# Patient Record
Sex: MALE | Race: WHITE | NOT HISPANIC OR LATINO | Employment: FULL TIME | URBAN - NONMETROPOLITAN AREA
[De-identification: names, ages, dates, MRNs, and addresses within clinical notes are randomized per-mention and may not be internally consistent; named-entity substitution may affect disease eponyms.]

---

## 2022-08-16 ENCOUNTER — OFFICE VISIT (OUTPATIENT)
Dept: FAMILY MEDICINE CLINIC | Facility: CLINIC | Age: 57
End: 2022-08-16

## 2022-08-16 VITALS
HEIGHT: 74 IN | DIASTOLIC BLOOD PRESSURE: 84 MMHG | OXYGEN SATURATION: 98 % | BODY MASS INDEX: 28.62 KG/M2 | SYSTOLIC BLOOD PRESSURE: 130 MMHG | WEIGHT: 223 LBS | HEART RATE: 69 BPM

## 2022-08-16 DIAGNOSIS — Z00.00 GENERAL MEDICAL EXAM: Primary | ICD-10-CM

## 2022-08-16 DIAGNOSIS — Z23 NEED FOR TDAP VACCINATION: ICD-10-CM

## 2022-08-16 DIAGNOSIS — Z12.11 SCREENING FOR COLON CANCER: ICD-10-CM

## 2022-08-16 DIAGNOSIS — Z12.5 PROSTATE CANCER SCREENING: ICD-10-CM

## 2022-08-16 DIAGNOSIS — Z13.1 DIABETES MELLITUS SCREENING: ICD-10-CM

## 2022-08-16 PROCEDURE — 99386 PREV VISIT NEW AGE 40-64: CPT | Performed by: FAMILY MEDICINE

## 2022-08-16 PROCEDURE — 93000 ELECTROCARDIOGRAM COMPLETE: CPT | Performed by: FAMILY MEDICINE

## 2022-08-16 PROCEDURE — 90715 TDAP VACCINE 7 YRS/> IM: CPT | Performed by: FAMILY MEDICINE

## 2022-08-16 PROCEDURE — 90471 IMMUNIZATION ADMIN: CPT | Performed by: FAMILY MEDICINE

## 2022-08-16 NOTE — PROGRESS NOTES
"Chief Complaint  Establish Care and Annual Exam    Subjective    History of Present Illness:  Wilmer Fitzgerald is a 56 y.o. male who presents today for annual exam.    No regular medications and exercises on a regular basis.    Past due for health maintenance and screening.  Will consider Shingrix at his pharmacy and plans to get force COVID vaccination.  Interested in Tdap today.    Would like baseline EKG done today to have on his chart in case he has any future issues or problems for comparison.  No chest pain, chest pressure, or shortness of breath.    Past due for screening colonoscopy and he is ready to get that set up.    Discussed prostate cancer screening and he would like screening PSA with fasting labs.  Understands the risk for false positives with this.    Will return to get fasting blood work up-to-date.    Objective   Vital Signs:   /84 (BP Location: Left arm, Patient Position: Sitting, Cuff Size: Large Adult)   Pulse 69   Ht 188 cm (74\")   Wt 101 kg (223 lb)   SpO2 98%   BMI 28.63 kg/m²     Review of Systems   Constitutional: Negative for appetite change, chills and fever.   HENT: Negative for hearing loss.         Episodes of mild hearing decrease noted.  Plans to get in with Dr. Son for full hearing evaluation.   Eyes: Negative for blurred vision.   Respiratory: Negative for chest tightness.    Cardiovascular: Negative for chest pain.   Gastrointestinal: Negative for abdominal pain.   Musculoskeletal: Negative for gait problem.   Skin: Negative for rash.   Psychiatric/Behavioral: Negative for depressed mood.       Past History:  Medical History: has a past medical history of Cholinergic urticaria and Foot trauma, left, sequela.   Surgical History: has no past surgical history on file.   Family History: family history is not on file.   Social History: reports that he has never smoked. He has never used smokeless tobacco. He reports current alcohol use. He reports that he does not use " drugs.    No current outpatient medications on file.    Allergies: Patient has no known allergies.    Physical Exam  Constitutional:       Appearance: Normal appearance.   HENT:      Head: Normocephalic.      Right Ear: Tympanic membrane, ear canal and external ear normal. There is no impacted cerumen.      Left Ear: Tympanic membrane, ear canal and external ear normal. There is no impacted cerumen.      Nose: Nose normal.      Mouth/Throat:      Mouth: Mucous membranes are moist.      Pharynx: Oropharynx is clear.   Eyes:      Conjunctiva/sclera: Conjunctivae normal.      Pupils: Pupils are equal, round, and reactive to light.   Cardiovascular:      Rate and Rhythm: Normal rate and regular rhythm.      Heart sounds: Normal heart sounds. No murmur heard.    No friction rub. No gallop.   Pulmonary:      Effort: Pulmonary effort is normal.      Breath sounds: Normal breath sounds.   Musculoskeletal:         General: Normal range of motion.      Cervical back: Normal range of motion.   Skin:     General: Skin is warm and dry.   Neurological:      General: No focal deficit present.      Mental Status: He is alert.   Psychiatric:         Mood and Affect: Mood normal.         Behavior: Behavior normal.         Thought Content: Thought content normal.          Result Review            ECG 12 Lead    Date/Time: 8/16/2022 10:14 AM  Performed by: Armond Sellers MD  Authorized by: Armond Sellers MD   Rhythm: sinus rhythm  Ectopy: infrequent PVCs  Rate: normal  BPM: 61  Conduction: conduction normal  ST Segments: ST segments normal  T Waves: T waves normal  QRS axis: normal    Clinical impression: normal ECG                Assessment and Plan  Diagnoses and all orders for this visit:    1. General medical exam (Primary)  Assessment & Plan:  Reviewed health maintenance and screening.    Ordered past 2 screening colonoscopy.    Awaiting return for fasting labs with screening PSA.    Updated vaccinations with  Tdap today.    Encourage Shingrix and force COVID booster at his pharmacy.    Baseline EKG obtained with normal sinus rhythm.    Orders:  -     CBC Auto Differential; Future  -     Comprehensive Metabolic Panel; Future  -     Lipid Panel; Future  -     TSH; Future  -     T4, Free; Future  -     ECG 12 Lead    2. Prostate cancer screening  -     PSA Screen; Future    3. Screening for colon cancer  -     Ambulatory Referral For Screening Colonoscopy    4. Need for Tdap vaccination  -     Tdap Vaccine Greater Than or Equal To 6yo IM    5. Diabetes mellitus screening  -     Hemoglobin A1c; Future      BMI is >= 25 and <30. (Overweight) The following options were offered after discussion;: exercise counseling/recommendations          Follow Up  No follow-ups on file.    Armond Sellers MD

## 2022-08-16 NOTE — ASSESSMENT & PLAN NOTE
Reviewed health maintenance and screening.    Ordered past 2 screening colonoscopy.    Awaiting return for fasting labs with screening PSA.    Updated vaccinations with Tdap today.    Encourage Shingrix and force COVID booster at his pharmacy.    Baseline EKG obtained with normal sinus rhythm.

## 2022-08-17 ENCOUNTER — LAB (OUTPATIENT)
Dept: FAMILY MEDICINE CLINIC | Facility: CLINIC | Age: 57
End: 2022-08-17

## 2022-08-17 DIAGNOSIS — Z00.00 GENERAL MEDICAL EXAM: ICD-10-CM

## 2022-08-17 DIAGNOSIS — Z13.1 DIABETES MELLITUS SCREENING: ICD-10-CM

## 2022-08-17 DIAGNOSIS — Z12.5 PROSTATE CANCER SCREENING: ICD-10-CM

## 2022-08-17 PROCEDURE — 36415 COLL VENOUS BLD VENIPUNCTURE: CPT | Performed by: FAMILY MEDICINE

## 2022-08-18 ENCOUNTER — TELEPHONE (OUTPATIENT)
Dept: FAMILY MEDICINE CLINIC | Facility: CLINIC | Age: 57
End: 2022-08-18

## 2022-08-18 LAB
ALBUMIN SERPL-MCNC: 4.3 G/DL (ref 3.8–4.9)
ALBUMIN/GLOB SERPL: 2 {RATIO} (ref 1.2–2.2)
ALP SERPL-CCNC: 39 IU/L (ref 44–121)
ALT SERPL-CCNC: 18 IU/L (ref 0–44)
AST SERPL-CCNC: 18 IU/L (ref 0–40)
BASOPHILS # BLD AUTO: 0 X10E3/UL (ref 0–0.2)
BASOPHILS NFR BLD AUTO: 1 %
BILIRUB SERPL-MCNC: 1.2 MG/DL (ref 0–1.2)
BUN SERPL-MCNC: 15 MG/DL (ref 6–24)
BUN/CREAT SERPL: 17 (ref 9–20)
CALCIUM SERPL-MCNC: 9.3 MG/DL (ref 8.7–10.2)
CHLORIDE SERPL-SCNC: 104 MMOL/L (ref 96–106)
CHOLEST SERPL-MCNC: 277 MG/DL (ref 100–199)
CO2 SERPL-SCNC: 22 MMOL/L (ref 20–29)
CREAT SERPL-MCNC: 0.89 MG/DL (ref 0.76–1.27)
EGFRCR-CYS SERPLBLD CKD-EPI 2021: 101 ML/MIN/1.73
EOSINOPHIL # BLD AUTO: 0.1 X10E3/UL (ref 0–0.4)
EOSINOPHIL NFR BLD AUTO: 2 %
ERYTHROCYTE [DISTWIDTH] IN BLOOD BY AUTOMATED COUNT: 13.1 % (ref 11.6–15.4)
GLOBULIN SER CALC-MCNC: 2.1 G/DL (ref 1.5–4.5)
GLUCOSE SERPL-MCNC: 87 MG/DL (ref 65–99)
HBA1C MFR BLD: 5.3 % (ref 4.8–5.6)
HCT VFR BLD AUTO: 46.9 % (ref 37.5–51)
HDLC SERPL-MCNC: 61 MG/DL
HGB BLD-MCNC: 15.5 G/DL (ref 13–17.7)
IMM GRANULOCYTES # BLD AUTO: 0 X10E3/UL (ref 0–0.1)
IMM GRANULOCYTES NFR BLD AUTO: 0 %
LDLC SERPL CALC-MCNC: 203 MG/DL (ref 0–99)
LYMPHOCYTES # BLD AUTO: 0.9 X10E3/UL (ref 0.7–3.1)
LYMPHOCYTES NFR BLD AUTO: 18 %
MCH RBC QN AUTO: 29.6 PG (ref 26.6–33)
MCHC RBC AUTO-ENTMCNC: 33 G/DL (ref 31.5–35.7)
MCV RBC AUTO: 90 FL (ref 79–97)
MONOCYTES # BLD AUTO: 0.5 X10E3/UL (ref 0.1–0.9)
MONOCYTES NFR BLD AUTO: 9 %
NEUTROPHILS # BLD AUTO: 3.5 X10E3/UL (ref 1.4–7)
NEUTROPHILS NFR BLD AUTO: 70 %
PLATELET # BLD AUTO: 223 X10E3/UL (ref 150–450)
POTASSIUM SERPL-SCNC: 4.6 MMOL/L (ref 3.5–5.2)
PROT SERPL-MCNC: 6.4 G/DL (ref 6–8.5)
PSA SERPL-MCNC: 1.2 NG/ML (ref 0–4)
RBC # BLD AUTO: 5.24 X10E6/UL (ref 4.14–5.8)
SODIUM SERPL-SCNC: 141 MMOL/L (ref 134–144)
T4 FREE SERPL-MCNC: 1.03 NG/DL (ref 0.82–1.77)
TRIGL SERPL-MCNC: 80 MG/DL (ref 0–149)
TSH SERPL DL<=0.005 MIU/L-ACNC: 2.09 UIU/ML (ref 0.45–4.5)
VLDLC SERPL CALC-MCNC: 13 MG/DL (ref 5–40)
WBC # BLD AUTO: 5 X10E3/UL (ref 3.4–10.8)

## 2022-08-18 NOTE — TELEPHONE ENCOUNTER
----- Message from Armond Sellers MD sent at 8/18/2022 12:44 PM EDT -----  Please call patient and let him know that his lab results returned and I did send them through the [employer] message but I wanted to make sure he got the results about his cholesterol being significantly elevated.    Please have him set up a follow-up visit to discuss cholesterol treatment options to help with cardiovascular and stroke risk reduction.  This can be in office or telehealth.

## 2022-08-18 NOTE — TELEPHONE ENCOUNTER
Left message for patient to call back. Please see message below when patient calls back. Hub can read.

## 2022-09-02 ENCOUNTER — TELEMEDICINE (OUTPATIENT)
Dept: FAMILY MEDICINE CLINIC | Facility: CLINIC | Age: 57
End: 2022-09-02

## 2022-09-02 DIAGNOSIS — E78.2 HYPERLIPIDEMIA, MIXED: Primary | ICD-10-CM

## 2022-09-02 PROCEDURE — 99213 OFFICE O/P EST LOW 20 MIN: CPT | Performed by: FAMILY MEDICINE

## 2022-09-02 NOTE — PROGRESS NOTES
Patient was seen today through synchronous audio/video technology. Verbal consent was obtained. The patient was located at home. Vitals signs were not obtained due to lack of home monitoring access.     I was located at our [facility] office location for the telehealth visit.    Chief Complaint  No chief complaint on file.    History of Present Illness  Wilmer Fitzgerald is a 56 y.o. male presenting via video-conference today for follow-up regarding significant hyperlipidemia with recent fasting labs.    Routine physical blood work returned normal except for total cholesterol at 277 and LDL cholesterol at 203.  His HDL cholesterol was 61 and triglycerides were 80.    Other than significant hyperlipidemia he has no significant risk factors for coronary artery disease or stroke.    He is interested in further investigation into his hyperlipidemia related to recommendations for medication treatment.    The following portions of the patient's history were reviewed and updated as appropriate: allergies, current medications, past family history, past medical history, past social history, past surgical history and problem list.    Review of Systems   Constitutional: Negative for appetite change, chills, fever and unexpected weight change.   HENT: Negative for hearing loss.    Eyes: Negative for visual disturbance.   Respiratory: Negative for chest tightness, shortness of breath and wheezing.    Cardiovascular: Negative for chest pain, palpitations and leg swelling.   Gastrointestinal: Negative for abdominal pain.   Musculoskeletal: Negative for arthralgias, back pain and gait problem.   Skin: Negative for rash.   Neurological: Negative for dizziness and headaches.   Psychiatric/Behavioral: Negative for agitation and confusion. The patient is not nervous/anxious.        Objective  There were no vitals taken for this visit.    Physical Exam  Constitutional:       General: He is not in acute  distress.     Appearance: Normal appearance. He is not ill-appearing.   HENT:      Head: Normocephalic and atraumatic.      Right Ear: External ear normal.      Left Ear: External ear normal.      Nose: Nose normal.   Eyes:      Extraocular Movements: Extraocular movements intact.      Conjunctiva/sclera: Conjunctivae normal.      Pupils: Pupils are equal, round, and reactive to light.   Pulmonary:      Effort: Pulmonary effort is normal. No respiratory distress.   Musculoskeletal:         General: Normal range of motion.      Cervical back: Normal range of motion.   Skin:     Findings: No rash.   Neurological:      General: No focal deficit present.      Mental Status: He is alert and oriented to person, place, and time.   Psychiatric:         Mood and Affect: Mood normal.         Behavior: Behavior normal.         Thought Content: Thought content normal.           Assessment/Plan   Diagnoses and all orders for this visit:    1. Hyperlipidemia, mixed (Primary)  Assessment & Plan:  Lengthy discussion with patient and his wife today about the significant elevation in his total cholesterol and LDL cholesterol and my concerns that this may translate into increased cardiovascular risk specifically heart attack and stroke risk.    We discussed treatment options and further work-up options.    Discussed further laboratory analysis with NMR LipoProfile is not something that I would routinely recommend, but referral to cardiology to review his lipids and discuss other noninvasive methods of risk factor investigation is an option.    He is interested in possibly having a coronary CT done to evaluate for plaque formation and cardiovascular risk.    We will set up a consultation with Dr. Somers to help us further investigate his cardiovascular risk and recommendation for lipid treatment given he would like more information before starting on medication for this significant hyperlipidemia found with his physical  today.    Questions answered.  Patient and his wife voiced understanding and are comfortable with plan for referral to Dr. Somers..    Orders:  -     Ambulatory Referral to Cardiology  Referral placed to Dr. Somers with [facility] cardiology    No follow-ups on file.    Armond Sellers MD

## 2022-09-02 NOTE — ASSESSMENT & PLAN NOTE
Lengthy discussion with patient and his wife today about the significant elevation in his total cholesterol and LDL cholesterol and my concerns that this may translate into increased cardiovascular risk specifically heart attack and stroke risk.    We discussed treatment options and further work-up options.    Discussed further laboratory analysis with NMR LipoProfile is not something that I would routinely recommend, but referral to cardiology to review his lipids and discuss other noninvasive methods of risk factor investigation is an option.    He is interested in possibly having a coronary CT done to evaluate for plaque formation and cardiovascular risk.    We will set up a consultation with Dr. Somers to help us further investigate his cardiovascular risk and recommendation for lipid treatment given he would like more information before starting on medication for this significant hyperlipidemia found with his physical today.    Questions answered.  Patient and his wife voiced understanding and are comfortable with plan for referral to Dr. Somers..

## 2022-10-11 NOTE — PROGRESS NOTES
OFFICE VISIT  NOTE  [facility] CARDIOLOGY [place] [facility]      Name: Wilmer Fitzgerald    Date: 10/12/2022  MRN:  [id number]  :  1965      REFERRING/PRIMARY PROVIDER:  Armond Sellers MD    Chief Complaint   Patient presents with   • mixed hyperlipidemia        HPI: Wilmer Fitzgerald is a 56 y.o. male who presents today for new consultation for hyperlipidemia.  Recent annual physical with Dr. Sellers, lipids checked, showed total cholesterol of 277 with LDL of 203 and HDL of 61, portending an ASCVD 10-year risk of 6.1%.  He is a healthy person, no diabetes hypertension, premature CVD family history, or smoking.  Participates in moderate intensity aerobic exercise walking at least 3 miles per day without chest pain or shortness of breath.  Follows a modified keto diet, eats unrestricted meat.    Past Medical History:   Diagnosis Date   • Cholinergic urticaria    • COVID-2022   • Foot trauma, left, sequela        History reviewed. No pertinent surgical history.    Social History     Socioeconomic History   • Marital status:    Tobacco Use   • Smoking status: Never   • Smokeless tobacco: Never   Substance and Sexual Activity   • Alcohol use: Yes     Comment: occ.   • Drug use: Never   • Sexual activity: Defer       History reviewed. No pertinent family history.     ROS:   Constitutional no fever,  no weight loss   Skin no rash, no subcutaneous nodules   Otolaryngeal no difficulty swallowing   Cardiovascular See HPI   Pulmonary no cough, no sputum production   Gastrointestinal no constipation, no diarrhea   Genitourinary no dysuria, no hematuria   Hematologic no easy bruisability, no abnormal bleeding   Musculoskeletal no muscle pain   Neurologic no dizziness, no falls         No Known Allergies      Current Outpatient Medications:   •  Cholecalciferol (VITAMIN D3 PO), Take 1 tablet by mouth As Needed., Disp: , Rfl:   •  MAGNESIUM PO, Take 1 tablet by mouth As Needed., Disp: , Rfl:  "    Vitals:    10/12/22 0856   BP: 120/80   BP Location: Left arm   Patient Position: Sitting   Pulse: 69   SpO2: 97%   Weight: 101 kg (223 lb)   Height: 190.5 cm (75\")     Body mass index is 27.87 kg/m².    PHYSICAL EXAM:    General Appearance:   · well developed  · well nourished  HENT:   · oropharynx moist  · lips not cyanotic  Neck:  · thyroid not enlarged  · supple  Respiratory:  · no respiratory distress  · normal breath sounds  · no rales  Cardiovascular:  · no jugular venous distention  · regular rhythm  · apical impulse normal  · S1 normal, S2 normal  · no S3, no S4   · no murmur  · no rub, no thrill  · carotid pulses normal; no bruit  · lower extremity edema: none      Musculoskeletal:  · no clubbing of fingers.   · normocephalic, head atraumatic  Skin:   · warm, dry  Psychiatric:  · judgement and insight appropriate  · normal mood and affect    RESULTS:   Procedures          Labs:  Lab Results   Component Value Date    TRIG 80 08/17/2022    HDL 61 08/17/2022     (H) 08/17/2022    AST 18 08/17/2022    ALT 18 08/17/2022     Lab Results   Component Value Date    HGBA1C 5.3 08/17/2022     No components found for: CREATINININE  No results found for: EGFRIFNONA    Most recent PCP note, imaging tests, and labs reviewed.    ASSESSMENT:  Problem List Items Addressed This Visit        Cardiac and Vasculature    Hyperlipidemia, mixed - Primary         PLAN:    1.  Mixed Hyperlipidemia:  The 10-year ASCVD risk score (Melanie MUSA, et al., 2019) is: 6.1%    Values used to calculate the score:      Age: 56 years      Sex: Male      Is Non- : No      Diabetic: No      Tobacco smoker: No      Systolic Blood Pressure: 120 mmHg      Is BP treated: No      HDL Cholesterol: 61 mg/dL      Total Cholesterol: 277 mg/dL    I had a lengthy discussion with the patient and his wife in the office today regarding his overall 10-year ASCVD risk which is borderline, low risk is considered less than 5%.  " Given his overall excellent health, and asymptomatic status, I feel it is reasonable to work on diet and continued exercise for ASCVD risk reduction.  He will work on cutting out his saturated fat and decrease animal fat intake.  I recommend repeating lipids in 3-6 months after he has had a trial of low saturated fat diet.    We discussed possibility of noninvasive testing such as coronary calcium score, at this time the patient is reluctant to start statin therapy and therefore I feel the test would have limited utility at this time.  However if his lipids fail to improve over the next 6 months coronary calcium score may be helpful in determining if he is a statin candidate.    Advance Care Planning   ACP discussion was held with the patient during this visit. Patient does not have an advance directive, information provided.     Spent approximately 35 minutes caring for this patient today including at least 20 minutes of face-to-face time with counseling and discussion of therapeutic options and prognosis.    Return to clinic in 6 months, or sooner as needed.    Thank you for the opportunity to share in the care of your patient; please do not hesitate to call me with any questions.     Dewey Somers MD, [facility]  Office: [phone]  [facility] [place] [address]    10/12/22

## 2022-10-12 ENCOUNTER — OFFICE VISIT (OUTPATIENT)
Dept: CARDIOLOGY | Facility: CLINIC | Age: 57
End: 2022-10-12

## 2022-10-12 VITALS
HEART RATE: 69 BPM | BODY MASS INDEX: 27.73 KG/M2 | WEIGHT: 223 LBS | HEIGHT: 75 IN | DIASTOLIC BLOOD PRESSURE: 80 MMHG | SYSTOLIC BLOOD PRESSURE: 120 MMHG | OXYGEN SATURATION: 97 %

## 2022-10-12 DIAGNOSIS — E78.2 HYPERLIPIDEMIA, MIXED: Primary | ICD-10-CM

## 2022-10-12 PROCEDURE — 99203 OFFICE O/P NEW LOW 30 MIN: CPT | Performed by: INTERNAL MEDICINE

## 2024-07-03 ENCOUNTER — OFFICE VISIT (OUTPATIENT)
Dept: FAMILY MEDICINE CLINIC | Facility: CLINIC | Age: 59
End: 2024-07-03
Payer: COMMERCIAL

## 2024-07-03 VITALS
DIASTOLIC BLOOD PRESSURE: 70 MMHG | WEIGHT: 233 LBS | OXYGEN SATURATION: 96 % | HEIGHT: 74 IN | BODY MASS INDEX: 29.9 KG/M2 | HEART RATE: 72 BPM | SYSTOLIC BLOOD PRESSURE: 118 MMHG

## 2024-07-03 DIAGNOSIS — Z13.1 DIABETES MELLITUS SCREENING: ICD-10-CM

## 2024-07-03 DIAGNOSIS — Z12.11 SCREENING FOR COLON CANCER: ICD-10-CM

## 2024-07-03 DIAGNOSIS — E78.2 HYPERLIPIDEMIA, MIXED: ICD-10-CM

## 2024-07-03 DIAGNOSIS — Z00.00 GENERAL MEDICAL EXAM: Primary | ICD-10-CM

## 2024-07-03 DIAGNOSIS — Z12.5 PROSTATE CANCER SCREENING: ICD-10-CM

## 2024-07-03 DIAGNOSIS — E66.3 OVERWEIGHT (BMI 25.0-29.9): ICD-10-CM

## 2024-07-03 PROBLEM — Z23 NEED FOR TDAP VACCINATION: Status: RESOLVED | Noted: 2022-08-16 | Resolved: 2024-07-03

## 2024-07-03 PROCEDURE — 99396 PREV VISIT EST AGE 40-64: CPT | Performed by: FAMILY MEDICINE

## 2024-07-03 NOTE — ASSESSMENT & PLAN NOTE
See HPI.  Lengthy discussion about past lab work and concerns for familial hyperlipidemia along with cardiovascular and stroke risk.  Recommended treatment with statin given his past LDL elevation and he would like to hold off until his lab work returns from this year.  If LDL remains significantly elevated he is willing to see cardiology and would like to pursue noninvasive testing with coronary calcium score and possibly screening carotid ultrasounds before deciding on medication treatment.    He does understand risks of untreated hyperlipidemia including cardiovascular issues and stroke risk

## 2024-07-03 NOTE — ASSESSMENT & PLAN NOTE
Discussed together health maintenance and screening along with vaccination options and healthy diet and exercise habits as part of the preventative counseling at their physical exam today.     Encouraged Kellie.  He will consider at his pharmacy

## 2024-07-03 NOTE — PROGRESS NOTES
"Chief Complaint  Annual Exam    Subjective    History of Present Illness:  Wilmer Fitzgerald is a 58 y.o. male who presents today for annual exam.    No regular medications and exercises on a regular basis.    Past due for health maintenance and screening.  Will consider Shingrix at his pharmacy    Past due for screening colonoscopy and he declines colonoscopy but is willing for Cologuard.  Understands this is suboptimal for colon cancer screening but prefers this to colonoscopy at this time.    Discussed prostate cancer screening and he would like screening PSA with fasting labs.     He is having some problems with left ear hearing decline and plans to get full audiological evaluation with Dr. Son    He does plan to get in for yearly skin check with dermatology.  Given written information for Dr. Pantoja.    Last blood work at his physical in 2022 did show significant hyperlipidemia consistent with familial hyperlipidemia.  We did discuss his LDL elevation over 200 and I recommended treatment for hyperlipidemia.  He would like to get a recheck on his labs and if his LDL does remain significantly elevated he is interested in seeing cardiology to consider coronary calcium score to help decide on starting a cholesterol medication.  He is reluctant to start cholesterol medications at this time and we did discuss risk of cardiovascular events including myocardial infarction and heart failure along with stroke risk.  He voiced understanding and would like to hold off on medications but is willing to see cardiology for further noninvasive testing if his LDL does remain elevated.  He is adopted so does not know much about his family history but what he has gathered from his mother side he does not recall an issue with stroke or heart disease.      Objective   Vital Signs:   /70 (BP Location: Left arm, Patient Position: Sitting, Cuff Size: Large Adult)   Pulse 72   Ht 188 cm (74\")   Wt 106 kg (233 lb)   SpO2 96%   " BMI 29.92 kg/m²     Review of Systems   Constitutional:  Positive for fatigue. Negative for appetite change, chills and fever.   HENT:  Positive for hearing loss.    Eyes:  Negative for blurred vision.   Respiratory:  Negative for chest tightness.    Cardiovascular:  Negative for chest pain.   Gastrointestinal:  Negative for abdominal pain.   Musculoskeletal:  Negative for gait problem.   Skin:  Negative for rash.   Psychiatric/Behavioral:  Negative for depressed mood.        Past History:  Medical History: has a past medical history of Cholinergic urticaria, COVID-19 (2022), and Foot trauma, left, sequela.   Surgical History: has no past surgical history on file.   Family History: family history is not on file.   Social History: reports that he has never smoked. He has never used smokeless tobacco. He reports current alcohol use of about 1.0 standard drink of alcohol per week. He reports that he does not use drugs.      Current Outpatient Medications:     MAGNESIUM PO, Take 1 tablet by mouth As Needed., Disp: , Rfl:     Allergies: Patient has no known allergies.    Physical Exam  Constitutional:       Appearance: Normal appearance.   HENT:      Head: Normocephalic.      Right Ear: Tympanic membrane, ear canal and external ear normal. There is no impacted cerumen.      Left Ear: Tympanic membrane, ear canal and external ear normal. There is no impacted cerumen.      Nose: Nose normal.      Mouth/Throat:      Mouth: Mucous membranes are moist.      Pharynx: Oropharynx is clear.   Eyes:      Extraocular Movements: Extraocular movements intact.      Conjunctiva/sclera: Conjunctivae normal.      Pupils: Pupils are equal, round, and reactive to light.   Cardiovascular:      Rate and Rhythm: Normal rate and regular rhythm.      Heart sounds: Normal heart sounds. No murmur heard.     No friction rub. No gallop.   Pulmonary:      Effort: Pulmonary effort is normal.      Breath sounds: Normal breath sounds.    Musculoskeletal:         General: Normal range of motion.      Cervical back: Normal range of motion. No rigidity or tenderness.   Skin:     General: Skin is warm and dry.   Neurological:      General: No focal deficit present.      Mental Status: He is alert.   Psychiatric:         Mood and Affect: Mood normal.         Behavior: Behavior normal.         Thought Content: Thought content normal.          Result Review                   Assessment and Plan  Diagnoses and all orders for this visit:    1. General medical exam (Primary)  Assessment & Plan:  Discussed together health maintenance and screening along with vaccination options and healthy diet and exercise habits as part of the preventative counseling at their physical exam today.     Encouraged Shingrix.  He will consider at his pharmacy    Orders:  -     CBC Auto Differential; Future  -     Comprehensive Metabolic Panel; Future  -     Lipid Panel; Future  -     TSH; Future  -     T4, Free; Future    2. Prostate cancer screening  -     PSA Screen; Future    3. Screening for colon cancer  Assessment & Plan:  Declines colonoscopy     Willing for Cologuard.  Ordered today    Orders:  -     Cologuard - Stool, Per Rectum; Future    4. Diabetes mellitus screening  -     Hemoglobin A1c; Future    5. Hyperlipidemia, mixed  Assessment & Plan:  See HPI.  Lengthy discussion about past lab work and concerns for familial hyperlipidemia along with cardiovascular and stroke risk.  Recommended treatment with statin given his past LDL elevation and he would like to hold off until his lab work returns from this year.  If LDL remains significantly elevated he is willing to see cardiology and would like to pursue noninvasive testing with coronary calcium score and possibly screening carotid ultrasounds before deciding on medication treatment.    He does understand risks of untreated hyperlipidemia including cardiovascular issues and stroke risk      6. Overweight (BMI  25.0-29.9)  Assessment & Plan:  Patient's (Body mass index is 29.92 kg/m².) indicates that they are overweight with health conditions that include dyslipidemias . Weight is unchanged. BMI is is above average; BMI management plan is completed. We discussed portion control and increasing exercise.                     Follow Up  Return in 1 year (on 7/7/2025) for Annual physical.    Armond Sellers MD

## 2024-07-03 NOTE — ASSESSMENT & PLAN NOTE
Patient's (Body mass index is 29.92 kg/m².) indicates that they are overweight with health conditions that include dyslipidemias . Weight is unchanged. BMI is is above average; BMI management plan is completed. We discussed portion control and increasing exercise.

## 2024-07-05 ENCOUNTER — LAB (OUTPATIENT)
Dept: FAMILY MEDICINE CLINIC | Facility: CLINIC | Age: 59
End: 2024-07-05
Payer: COMMERCIAL

## 2024-07-05 DIAGNOSIS — Z00.00 GENERAL MEDICAL EXAM: ICD-10-CM

## 2024-07-05 DIAGNOSIS — Z13.1 DIABETES MELLITUS SCREENING: ICD-10-CM

## 2024-07-05 DIAGNOSIS — Z12.5 PROSTATE CANCER SCREENING: ICD-10-CM

## 2024-07-05 PROCEDURE — 36415 COLL VENOUS BLD VENIPUNCTURE: CPT | Performed by: FAMILY MEDICINE

## 2024-07-06 LAB
ALBUMIN SERPL-MCNC: 4.3 G/DL (ref 3.8–4.9)
ALP SERPL-CCNC: 38 IU/L (ref 44–121)
ALT SERPL-CCNC: 27 IU/L (ref 0–44)
AST SERPL-CCNC: 18 IU/L (ref 0–40)
BASOPHILS # BLD AUTO: 0 X10E3/UL (ref 0–0.2)
BASOPHILS NFR BLD AUTO: 1 %
BILIRUB SERPL-MCNC: 0.8 MG/DL (ref 0–1.2)
BUN SERPL-MCNC: 16 MG/DL (ref 6–24)
BUN/CREAT SERPL: 19 (ref 9–20)
CALCIUM SERPL-MCNC: 8.9 MG/DL (ref 8.7–10.2)
CHLORIDE SERPL-SCNC: 104 MMOL/L (ref 96–106)
CHOLEST SERPL-MCNC: 242 MG/DL (ref 100–199)
CO2 SERPL-SCNC: 23 MMOL/L (ref 20–29)
CREAT SERPL-MCNC: 0.86 MG/DL (ref 0.76–1.27)
EGFRCR SERPLBLD CKD-EPI 2021: 101 ML/MIN/1.73
EOSINOPHIL # BLD AUTO: 0.1 X10E3/UL (ref 0–0.4)
EOSINOPHIL NFR BLD AUTO: 3 %
ERYTHROCYTE [DISTWIDTH] IN BLOOD BY AUTOMATED COUNT: 13 % (ref 11.6–15.4)
GLOBULIN SER CALC-MCNC: 2.2 G/DL (ref 1.5–4.5)
GLUCOSE SERPL-MCNC: 107 MG/DL (ref 70–99)
HBA1C MFR BLD: 5.4 % (ref 4.8–5.6)
HCT VFR BLD AUTO: 44.9 % (ref 37.5–51)
HDLC SERPL-MCNC: 56 MG/DL
HGB BLD-MCNC: 15.4 G/DL (ref 13–17.7)
IMM GRANULOCYTES # BLD AUTO: 0 X10E3/UL (ref 0–0.1)
IMM GRANULOCYTES NFR BLD AUTO: 0 %
LDLC SERPL CALC-MCNC: 170 MG/DL (ref 0–99)
LYMPHOCYTES # BLD AUTO: 1 X10E3/UL (ref 0.7–3.1)
LYMPHOCYTES NFR BLD AUTO: 23 %
MCH RBC QN AUTO: 30.4 PG (ref 26.6–33)
MCHC RBC AUTO-ENTMCNC: 34.3 G/DL (ref 31.5–35.7)
MCV RBC AUTO: 89 FL (ref 79–97)
MONOCYTES # BLD AUTO: 0.5 X10E3/UL (ref 0.1–0.9)
MONOCYTES NFR BLD AUTO: 10 %
NEUTROPHILS # BLD AUTO: 3 X10E3/UL (ref 1.4–7)
NEUTROPHILS NFR BLD AUTO: 63 %
PLATELET # BLD AUTO: 220 X10E3/UL (ref 150–450)
POTASSIUM SERPL-SCNC: 3.9 MMOL/L (ref 3.5–5.2)
PROT SERPL-MCNC: 6.5 G/DL (ref 6–8.5)
PSA SERPL-MCNC: 1.3 NG/ML (ref 0–4)
RBC # BLD AUTO: 5.06 X10E6/UL (ref 4.14–5.8)
SODIUM SERPL-SCNC: 140 MMOL/L (ref 134–144)
T4 FREE SERPL-MCNC: 1.14 NG/DL (ref 0.82–1.77)
TRIGL SERPL-MCNC: 91 MG/DL (ref 0–149)
TSH SERPL DL<=0.005 MIU/L-ACNC: 1.48 UIU/ML (ref 0.45–4.5)
VLDLC SERPL CALC-MCNC: 16 MG/DL (ref 5–40)
WBC # BLD AUTO: 4.6 X10E3/UL (ref 3.4–10.8)

## 2024-07-08 DIAGNOSIS — E78.2 HYPERLIPIDEMIA, MIXED: Primary | ICD-10-CM

## 2024-07-16 ENCOUNTER — TELEPHONE (OUTPATIENT)
Dept: FAMILY MEDICINE CLINIC | Facility: CLINIC | Age: 59
End: 2024-07-16
Payer: COMMERCIAL

## 2024-07-16 NOTE — TELEPHONE ENCOUNTER
Name: Wilmer Fitzgerald      Relationship: Self      Best Callback Number: [phone]       HUB PROVIDED THE RELAY MESSAGE FROM THE OFFICE      PATIENT: VOICED UNDERSTANDING AND HAS NO FURTHER QUESTIONS AT THIS TIME    ADDITIONAL INFORMATION:

## 2024-07-16 NOTE — TELEPHONE ENCOUNTER
HUB TO RELAY    Your recent metabolic panel returned with normal kidney function, good liver enzymes, normal electrolytes, and mild blood sugar elevation at 107 in the prediabetes range.  Your A1c did return normal with no concerns for diabetes.     Your lipid panel did return with elevation in your cholesterol.  It has improved a little compared to past labs but does remain significantly elevated.  I would like for you to consider medications for cholesterol to help with heart protection and stroke risk reduction.  I did place a consult request as we discussed for Dr. Walters so you can go over some noninvasive testing options regarding your cholesterol elevation and get his recommendation regarding treatment.     Your thyroid function blood work returned normal.     Your PSA for prostate cancer screening returned normal.     Your blood count returned normal with no anemia or evidence for infection.     Please let me know if you have any questions and you will receive a call with your appointment to see cardiology as soon as this is scheduled   Yes

## 2024-07-17 ENCOUNTER — TELEPHONE (OUTPATIENT)
Dept: FAMILY MEDICINE CLINIC | Facility: CLINIC | Age: 59
End: 2024-07-17
Payer: COMMERCIAL

## 2024-07-29 ENCOUNTER — OFFICE VISIT (OUTPATIENT)
Dept: CARDIOLOGY | Facility: CLINIC | Age: 59
End: 2024-07-29
Payer: COMMERCIAL

## 2024-07-29 VITALS
WEIGHT: 232 LBS | HEIGHT: 74 IN | DIASTOLIC BLOOD PRESSURE: 72 MMHG | RESPIRATION RATE: 18 BRPM | OXYGEN SATURATION: 98 % | SYSTOLIC BLOOD PRESSURE: 118 MMHG | BODY MASS INDEX: 29.77 KG/M2 | HEART RATE: 77 BPM

## 2024-07-29 DIAGNOSIS — E78.2 HYPERLIPIDEMIA, MIXED: Primary | ICD-10-CM

## 2024-07-29 PROCEDURE — 93000 ELECTROCARDIOGRAM COMPLETE: CPT | Performed by: INTERNAL MEDICINE

## 2024-07-29 PROCEDURE — 99213 OFFICE O/P EST LOW 20 MIN: CPT | Performed by: INTERNAL MEDICINE

## 2024-07-29 NOTE — ASSESSMENT & PLAN NOTE
Lipid abnormalities are newly identified    Plan:  Lipid lowering therapy not prescribed due to therapeutic lifestyle changes    Discussed medication dosage, use, side effects, and goals of treatment in detail.    Counseled patient on lifestyle modifications to help control hyperlipidemia.   Cholesterol lowering dietary information shared with patient.  Low Dose Coronary CT scan ordered  Advised patient to exercise for 150 minutes weekly. (30 minute brisk walk, 5 days a week for example)  Weight Loss encouraged  Patient counseled in regards to heart healthy, low fat/ low cholesterol/low sat diet, daily exercise for 30 minutes, low to moderate intensity, and weight loss.  Discussed with patient his lipid profile.  Room to improve in diet, in view of currently rich in animal fats and processed food.  Patient committed to lifestyle changes.  Discussed calcium scoring in regards of cardiovascular risk and onset of coronary artery disease.  Patient agreeable.  Follow-up lipid profile 3 months after peak therapeutic lifestyle changes.    Lab Results   Component Value Date    CHLPL 242 (H) 07/05/2024    CHLPL 277 (H) 08/17/2022    TRIG 91 07/05/2024    TRIG 80 08/17/2022    HDL 56 07/05/2024    HDL 61 08/17/2022     (H) 07/05/2024     (H) 08/17/2022   The 10-year ASCVD risk score (Melanie DK, et al., 2019) is: 6.5%     Goal of therapy: LDL  mg/dL    Followup in 6 months.

## 2024-07-29 NOTE — PROGRESS NOTES
"[facility]  [facility] CARDIOLOGY  [address] [place] [address]  Dept: [phone]  Dept Fax: [phone]    Date: 07/29/2024  Patient: Wilmer Fitzgerald  YOB: 1965    New Patient Office Note    Consult Reason:  Mr. Wilmer Fitzgerald is a 58 y.o. male who presents to the clinic to establish care, seen for Hyperlipidemia.   Patient doing well with no complaints.  Patient denies angina, orthopnea, PND, palpitations, lightheadedness, syncope or medications side-effects.      The following portions of the patient's history were reviewed and updated as appropriate: allergies, current medications, past family history, past medical history, past social history, past surgical history, and problem list.    Medications: No Known Allergies   Current Outpatient Medications   Medication Instructions    MAGNESIUM PO 1 tablet, Oral, As Needed       Subjective  Past Medical History:   Diagnosis Date    Cholinergic urticaria     COVID-19 2022    Foot trauma, left, sequela        Past Surgical History:   Procedure Laterality Date    MOUTH SURGERY         History reviewed. No pertinent family history.     Social History     Socioeconomic History    Marital status:    Tobacco Use    Smoking status: Never    Smokeless tobacco: Never   Vaping Use    Vaping status: Never Used   Substance and Sexual Activity    Alcohol use: Yes     Alcohol/week: 1.0 standard drink of alcohol     Types: 1 Cans of beer per week     Comment: occ.    Drug use: Never    Sexual activity: Yes     Partners: Female       Objective  Vitals:    07/29/24 1257   BP: 118/72   BP Location: Right arm   Patient Position: Sitting   Pulse: 77   Resp: 18   SpO2: 98%   Weight: 105 kg (232 lb)   Height: 188 cm (74\")   PainSc: 0-No pain     Vitals:    07/29/24 1257   BP: 118/72   BP Location: Right arm   Patient Position: Sitting   Pulse: 77   Resp: 18   SpO2: 98%   Weight: 105 kg (232 lb)   Height: 188 cm (74\")    " "    Physical Exam  Constitutional:       Appearance: Healthy appearance. Not in distress.   Eyes:      Pupils: Pupils are equal, round, and reactive to light.   HENT:    Mouth/Throat:      Mouth: Mucous membranes are moist.   Neck:      Vascular: No carotid bruit, hepatojugular reflux, JVD or JVR. JVD normal.   Pulmonary:      Effort: Pulmonary effort is normal.      Breath sounds: Normal breath sounds. No wheezing. No rhonchi. No rales.   Chest:      Chest wall: Not tender to palpatation.   Cardiovascular:      PMI at left midclavicular line. Normal rate. Regular rhythm. Normal S1 with normal intensity. Normal S2 with normal intensity.       Murmurs: There is no murmur.      No gallop.  No click. No rub.   Pulses:     Carotid: 4+ bilaterally.     Radial: 4+ bilaterally.     Popliteal: 4+ bilaterally.     Dorsalis pedis: 4+ bilaterally.  Edema:     Peripheral edema absent.   Abdominal:      General: There is no abdominal bruit.   Skin:     General: Skin is warm.   Neurological:      Mental Status: Alert and oriented to person, place and time.              Labs:  Lab Results   Component Value Date     07/05/2024    K 3.9 07/05/2024     07/05/2024    CO2 23 07/05/2024    BUN 16 07/05/2024    CREATININE 0.86 07/05/2024    CALCIUM 8.9 07/05/2024    BILITOT 0.8 07/05/2024    ALKPHOS 38 (L) 07/05/2024    ALT 27 07/05/2024    AST 18 07/05/2024    GLUCOSE 107 (H) 07/05/2024    ALBUMIN 4.3 07/05/2024     Lab Results   Component Value Date    WBC 4.6 07/05/2024    HGB 15.4 07/05/2024    HCT 44.9 07/05/2024     07/05/2024     No results found for: \"APTT\", \"INR\", \"PTT\"  No results found for: \"CKTOTAL\", \"TROPONINI\", \"TROPONINT\", \"CKMBINDEX\", \"BNP\"  No results found for: \"BNP\", \"PROBNP\"    Lab Results   Component Value Date    CHLPL 242 (H) 07/05/2024    TRIG 91 07/05/2024    HDL 56 07/05/2024     (H) 07/05/2024     Lab Results   Component Value Date    TSH 1.480 07/05/2024    FREET4 1.14 07/05/2024 "       The 10-year ASCVD risk score (Melanie MUSA, et al., 2019) is: 6.5%    Values used to calculate the score:      Age: 58 years      Sex: Male      Is Non- : No      Diabetic: No      Tobacco smoker: No      Systolic Blood Pressure: 118 mmHg      Is BP treated: No      HDL Cholesterol: 56 mg/dL      Total Cholesterol: 242 mg/dL     CV Diagnostics:    ECG 12 Lead    Date/Time: 7/29/2024 1:23 PM  Performed by: Andrey Walters MD    Authorized by: Andrey Walters MD  Comparison: compared with previous ECG from 8/16/2022  Similar to previous ECG  Rhythm: sinus rhythm    Clinical impression: normal ECG          CXR: No results found for this or any previous visit.     ECHO/MUGA: No results found for this or any previous visit.     STRESS TESTS: No results found for this or any previous visit.     CARDIAC CATH: No results found for this or any previous visit.     DEVICES: No valid procedures specified.   HOLTER: No results found for this or any previous visit.     CT/MRI:  No results found for this or any previous visit.    VASCULAR: No valid procedures specified.     Assessment and Plan  Diagnoses and all orders for this visit:    1. Hyperlipidemia, mixed (Primary)  Assessment & Plan:   Lipid abnormalities are newly identified    Plan:  Lipid lowering therapy not prescribed due to therapeutic lifestyle changes    Discussed medication dosage, use, side effects, and goals of treatment in detail.    Counseled patient on lifestyle modifications to help control hyperlipidemia.   Cholesterol lowering dietary information shared with patient.  Low Dose Coronary CT scan ordered  Advised patient to exercise for 150 minutes weekly. (30 minute brisk walk, 5 days a week for example)  Weight Loss encouraged  Patient counseled in regards to heart healthy, low fat/ low cholesterol/low sat diet, daily exercise for 30 minutes, low to moderate intensity, and weight loss.  Discussed with patient his lipid profile.   Room to improve in diet, in view of currently rich in animal fats and processed food.  Patient committed to lifestyle changes.  Discussed calcium scoring in regards of cardiovascular risk and onset of coronary artery disease.  Patient agreeable.  Follow-up lipid profile 3 months after peak therapeutic lifestyle changes.    Lab Results   Component Value Date    CHLPL 242 (H) 07/05/2024    CHLPL 277 (H) 08/17/2022    TRIG 91 07/05/2024    TRIG 80 08/17/2022    HDL 56 07/05/2024    HDL 61 08/17/2022     (H) 07/05/2024     (H) 08/17/2022   The 10-year ASCVD risk score (Pease DK, et al., 2019) is: 6.5%     Goal of therapy: LDL  mg/dL    Followup in 6 months.    Orders:  -     CT Cardiac Calcium Score Without Dye; Future    Other orders  -     ECG 12 Lead         Return in about 6 months (around 1/29/2025) for Follow-up with Dr Walters.    There are no Patient Instructions on file for this visit.    Andrey Walters MD

## 2024-09-19 ENCOUNTER — HOSPITAL ENCOUNTER (OUTPATIENT)
Facility: HOSPITAL | Age: 59
Discharge: HOME | End: 2024-09-19
Admitting: INTERNAL MEDICINE

## 2024-09-19 DIAGNOSIS — E78.2 HYPERLIPIDEMIA, MIXED: ICD-10-CM

## 2024-09-19 PROCEDURE — 75571 CT HRT W/O DYE W/CA TEST: CPT

## 2025-01-27 ENCOUNTER — TELEPHONE (OUTPATIENT)
Dept: CARDIOLOGY | Facility: CLINIC | Age: 60
End: 2025-01-27
Payer: COMMERCIAL

## 2025-06-18 ENCOUNTER — APPOINTMENT (OUTPATIENT)
Facility: HOSPITAL | Age: 60
End: 2025-06-18
Payer: COMMERCIAL

## 2025-06-18 ENCOUNTER — HOSPITAL ENCOUNTER (EMERGENCY)
Facility: HOSPITAL | Age: 60
Discharge: HOME | End: 2025-06-18
Attending: FAMILY MEDICINE | Admitting: FAMILY MEDICINE
Payer: COMMERCIAL

## 2025-06-18 VITALS
WEIGHT: 237.6 LBS | TEMPERATURE: 97.9 F | DIASTOLIC BLOOD PRESSURE: 98 MMHG | HEART RATE: 84 BPM | HEIGHT: 74 IN | SYSTOLIC BLOOD PRESSURE: 129 MMHG | OXYGEN SATURATION: 98 % | RESPIRATION RATE: 18 BRPM | BODY MASS INDEX: 30.49 KG/M2

## 2025-06-18 DIAGNOSIS — H53.9 VISION CHANGES: ICD-10-CM

## 2025-06-18 DIAGNOSIS — J01.00 ACUTE NON-RECURRENT MAXILLARY SINUSITIS: Primary | ICD-10-CM

## 2025-06-18 LAB
ALBUMIN SERPL-MCNC: 4.7 G/DL (ref 3.5–5.2)
ALBUMIN/GLOB SERPL: 2 G/DL
ALP SERPL-CCNC: 46 U/L (ref 39–117)
ALT SERPL W P-5'-P-CCNC: 24 U/L (ref 1–41)
ANION GAP SERPL CALCULATED.3IONS-SCNC: 9.6 MMOL/L (ref 5–15)
AST SERPL-CCNC: 21 U/L (ref 1–40)
BASOPHILS # BLD AUTO: 0.03 10*3/MM3 (ref 0–0.2)
BASOPHILS NFR BLD AUTO: 0.5 % (ref 0–1.5)
BILIRUB SERPL-MCNC: 0.8 MG/DL (ref 0–1.2)
BUN SERPL-MCNC: 17.8 MG/DL (ref 6–20)
BUN/CREAT SERPL: 18 (ref 7–25)
CALCIUM SPEC-SCNC: 9.8 MG/DL (ref 8.6–10.5)
CHLORIDE SERPL-SCNC: 107 MMOL/L (ref 98–107)
CO2 SERPL-SCNC: 25.4 MMOL/L (ref 22–29)
CREAT SERPL-MCNC: 0.99 MG/DL (ref 0.76–1.27)
CRP SERPL-MCNC: <0.3 MG/DL (ref 0–0.5)
DEPRECATED RDW RBC AUTO: 38.7 FL (ref 37–54)
EGFRCR SERPLBLD CKD-EPI 2021: 88.3 ML/MIN/1.73
EOSINOPHIL # BLD AUTO: 0.08 10*3/MM3 (ref 0–0.4)
EOSINOPHIL NFR BLD AUTO: 1.3 % (ref 0.3–6.2)
ERYTHROCYTE [DISTWIDTH] IN BLOOD BY AUTOMATED COUNT: 12.6 % (ref 12.3–15.4)
GEN 5 1HR TROPONIN T REFLEX: <6 NG/L
GLOBULIN UR ELPH-MCNC: 2.3 GM/DL
GLUCOSE SERPL-MCNC: 106 MG/DL (ref 65–99)
HCT VFR BLD AUTO: 44.6 % (ref 37.5–51)
HGB BLD-MCNC: 15.6 G/DL (ref 13–17.7)
HOLD SPECIMEN: NORMAL
IMM GRANULOCYTES # BLD AUTO: 0.01 10*3/MM3 (ref 0–0.05)
IMM GRANULOCYTES NFR BLD AUTO: 0.2 % (ref 0–0.5)
LYMPHOCYTES # BLD AUTO: 1.59 10*3/MM3 (ref 0.7–3.1)
LYMPHOCYTES NFR BLD AUTO: 25.8 % (ref 19.6–45.3)
MAGNESIUM SERPL-MCNC: 2.3 MG/DL (ref 1.6–2.6)
MCH RBC QN AUTO: 29.5 PG (ref 26.6–33)
MCHC RBC AUTO-ENTMCNC: 35 G/DL (ref 31.5–35.7)
MCV RBC AUTO: 84.5 FL (ref 79–97)
MONOCYTES # BLD AUTO: 0.55 10*3/MM3 (ref 0.1–0.9)
MONOCYTES NFR BLD AUTO: 8.9 % (ref 5–12)
NEUTROPHILS NFR BLD AUTO: 3.91 10*3/MM3 (ref 1.7–7)
NEUTROPHILS NFR BLD AUTO: 63.3 % (ref 42.7–76)
NT-PROBNP SERPL-MCNC: 36.1 PG/ML (ref 0–900)
PLATELET # BLD AUTO: 251 10*3/MM3 (ref 140–450)
PMV BLD AUTO: 8.2 FL (ref 6–12)
POTASSIUM SERPL-SCNC: 4.8 MMOL/L (ref 3.5–5.2)
PROT SERPL-MCNC: 7 G/DL (ref 6–8.5)
QT INTERVAL: 396 MS
QTC INTERVAL: 421 MS
RBC # BLD AUTO: 5.28 10*6/MM3 (ref 4.14–5.8)
SODIUM SERPL-SCNC: 142 MMOL/L (ref 136–145)
TROPONIN T NUMERIC DELTA: NORMAL
TROPONIN T SERPL HS-MCNC: <6 NG/L
TSH SERPL DL<=0.05 MIU/L-ACNC: 1.05 UIU/ML (ref 0.27–4.2)
WBC NRBC COR # BLD AUTO: 6.17 10*3/MM3 (ref 3.4–10.8)
WHOLE BLOOD HOLD COAG: NORMAL

## 2025-06-18 PROCEDURE — 93005 ELECTROCARDIOGRAM TRACING: CPT | Performed by: FAMILY MEDICINE

## 2025-06-18 PROCEDURE — 83735 ASSAY OF MAGNESIUM: CPT | Performed by: FAMILY MEDICINE

## 2025-06-18 PROCEDURE — 84484 ASSAY OF TROPONIN QUANT: CPT | Performed by: FAMILY MEDICINE

## 2025-06-18 PROCEDURE — 70450 CT HEAD/BRAIN W/O DYE: CPT

## 2025-06-18 PROCEDURE — 85025 COMPLETE CBC W/AUTO DIFF WBC: CPT | Performed by: FAMILY MEDICINE

## 2025-06-18 PROCEDURE — 83880 ASSAY OF NATRIURETIC PEPTIDE: CPT | Performed by: FAMILY MEDICINE

## 2025-06-18 PROCEDURE — 71045 X-RAY EXAM CHEST 1 VIEW: CPT

## 2025-06-18 PROCEDURE — 36415 COLL VENOUS BLD VENIPUNCTURE: CPT

## 2025-06-18 PROCEDURE — 25510000001 IOPAMIDOL PER 1 ML: Performed by: FAMILY MEDICINE

## 2025-06-18 PROCEDURE — 99285 EMERGENCY DEPT VISIT HI MDM: CPT | Performed by: FAMILY MEDICINE

## 2025-06-18 PROCEDURE — A9577 INJ MULTIHANCE: HCPCS | Performed by: FAMILY MEDICINE

## 2025-06-18 PROCEDURE — 70496 CT ANGIOGRAPHY HEAD: CPT

## 2025-06-18 PROCEDURE — 70498 CT ANGIOGRAPHY NECK: CPT

## 2025-06-18 PROCEDURE — 80053 COMPREHEN METABOLIC PANEL: CPT | Performed by: FAMILY MEDICINE

## 2025-06-18 PROCEDURE — 99214 OFFICE O/P EST MOD 30 MIN: CPT | Performed by: NURSE PRACTITIONER

## 2025-06-18 PROCEDURE — 25510000002 GADOBENATE DIMEGLUMINE 529 MG/ML SOLUTION: Performed by: FAMILY MEDICINE

## 2025-06-18 PROCEDURE — 84443 ASSAY THYROID STIM HORMONE: CPT | Performed by: FAMILY MEDICINE

## 2025-06-18 PROCEDURE — 86140 C-REACTIVE PROTEIN: CPT | Performed by: FAMILY MEDICINE

## 2025-06-18 PROCEDURE — 70553 MRI BRAIN STEM W/O & W/DYE: CPT

## 2025-06-18 RX ORDER — IOPAMIDOL 755 MG/ML
100 INJECTION, SOLUTION INTRAVASCULAR
Status: COMPLETED | OUTPATIENT
Start: 2025-06-18 | End: 2025-06-18

## 2025-06-18 RX ADMIN — GADOBENATE DIMEGLUMINE 20 ML: 529 INJECTION, SOLUTION INTRAVENOUS at 16:03

## 2025-06-18 RX ADMIN — IOPAMIDOL 85 ML: 755 INJECTION, SOLUTION INTRAVENOUS at 13:40

## 2025-06-18 NOTE — FSED PROVIDER NOTE
Subjective   History of Present Illness  Patient is a 59-year-old male who presents to the emergency department for sudden onset of vision changes, and headache.  The patient states that he was at rest when he suddenly developed triple vision which she describes as vertically stacked.  The patient states that this lasted for about 40 minutes and resolved on its own.  He additionally began having a severe left-sided headache just after the initial onset of the symptoms.  The patient states that this has also subsided.  While the symptoms were occurring he felt like his speech was slurred, and he developed weakness in both upper extremities.  The patient has no lingering symptoms at this time, and has never had any history of migraine headaches or similar symptoms in the past.  He denies experiencing any confusion, nausea, vomiting or other symptoms.  He is taking an herbal supplement in place of a baby aspirin.  He denies chest pain shortness of breath or recent illness.      Review of Systems   Constitutional:  Negative for activity change, appetite change, chills, diaphoresis, fatigue and fever.   HENT:  Negative for congestion, postnasal drip, rhinorrhea, sinus pressure, sinus pain, sneezing and sore throat.    Eyes:  Positive for visual disturbance. Negative for pain, discharge, redness and itching.   Respiratory:  Negative for apnea, cough, shortness of breath and wheezing.    Cardiovascular:  Negative for chest pain, palpitations and leg swelling.   Gastrointestinal:  Negative for abdominal distention, abdominal pain, constipation, diarrhea, nausea and vomiting.   Genitourinary:  Negative for difficulty urinating and flank pain.   Musculoskeletal:  Negative for arthralgias, back pain and neck pain.   Skin:  Negative for color change and pallor.   Neurological:  Positive for headaches. Negative for dizziness and light-headedness.   Psychiatric/Behavioral:  Negative for agitation and confusion.        Past  Medical History:   Diagnosis Date    Cholinergic urticaria     COVID-19 2022    Foot trauma, left, sequela        No Known Allergies    Past Surgical History:   Procedure Laterality Date    DENTAL PROCEDURE      MOUTH SURGERY         History reviewed. No pertinent family history.    Social History     Socioeconomic History    Marital status:    Tobacco Use    Smoking status: Never    Smokeless tobacco: Never   Vaping Use    Vaping status: Never Used   Substance and Sexual Activity    Alcohol use: Yes     Alcohol/week: 1.0 standard drink of alcohol     Types: 1 Cans of beer per week     Comment: occ.    Drug use: Never    Sexual activity: Yes     Partners: Female           Objective   Physical Exam  Vitals and nursing note reviewed.   Constitutional:       General: He is not in acute distress.     Appearance: Normal appearance. He is normal weight. He is not ill-appearing, toxic-appearing or diaphoretic.   HENT:      Head: Normocephalic and atraumatic.      Right Ear: Tympanic membrane, ear canal and external ear normal. There is no impacted cerumen.      Left Ear: A middle ear effusion is present. There is no impacted cerumen.      Nose: Nose normal. No congestion or rhinorrhea.      Mouth/Throat:      Mouth: Mucous membranes are moist.      Pharynx: No oropharyngeal exudate or posterior oropharyngeal erythema.   Eyes:      General: No visual field deficit or scleral icterus.        Right eye: No discharge.      Pupils: Pupils are equal, round, and reactive to light.   Neck:      Vascular: No carotid bruit.   Cardiovascular:      Rate and Rhythm: Normal rate and regular rhythm.      Pulses: Normal pulses.      Heart sounds: Normal heart sounds. No murmur heard.     No friction rub. No gallop.   Pulmonary:      Effort: Pulmonary effort is normal. No respiratory distress.      Breath sounds: Normal breath sounds. No stridor. No wheezing, rhonchi or rales.   Chest:      Chest wall: No tenderness.   Abdominal:       General: Abdomen is flat. There is no distension.      Palpations: There is no mass.      Tenderness: There is no abdominal tenderness. There is no guarding or rebound.      Hernia: No hernia is present.   Musculoskeletal:      Cervical back: Normal range of motion and neck supple. No rigidity or tenderness.   Lymphadenopathy:      Cervical: No cervical adenopathy.   Skin:     General: Skin is warm and dry.      Capillary Refill: Capillary refill takes less than 2 seconds.      Coloration: Skin is not jaundiced or pale.      Findings: No bruising or erythema.   Neurological:      General: No focal deficit present.      Mental Status: He is alert and oriented to person, place, and time. Mental status is at baseline.      Cranial Nerves: No cranial nerve deficit, dysarthria or facial asymmetry.      Sensory: Sensation is intact. No sensory deficit.      Motor: Motor function is intact. No weakness, tremor, atrophy or abnormal muscle tone.      Coordination: Coordination normal. Finger-Nose-Finger Test and Heel to Shin Test normal.      Gait: Gait normal.      Deep Tendon Reflexes: Reflexes normal.   Psychiatric:         Mood and Affect: Mood normal.         Behavior: Behavior normal.         Procedures           ED Course  ED Course as of 06/18/25 2213 Wed Jun 18, 2025   1354 EKG is normal sinus rhythm with a rate of 68 bpm WV interval is 170 ms QRS is 90 ms QTc 421 ms [EG]   2210 High Sensitivity Troponin T 1Hr [EG]   2210 BNP [EG]   2210 CBC & Differential [EG]   2210 TSH Baseline: 1.050 [EG]   2210 Magnesium: 2.3 [EG]   2210 C-Reactive Protein: <0.30 [EG]   2210 HS Troponin T: <6 [EG]   2210 Comprehensive Metabolic Panel(!) [EG]   2210 MRI Brain With & Without Contrast [EG]   2210 XR Chest 1 View [EG]   2210 CT Angiogram Head w AI Analysis of LVO [EG]   2210 CT Angiogram Neck [EG]   2210 CT Head Without Contrast Stroke Protocol [EG]      ED Course User Index  [EG] Mindy Cano DO                           Total (NIH Stroke Scale): 0                Medical Decision Making  Patient is a 59-year-old male who presents to the emergency department for sudden onset of vision changes, and headache.  The patient states that he was at rest when he suddenly developed triple vision which she describes as vertically stacked.  The patient states that this lasted for about 40 minutes and resolved on its own.  He additionally began having a severe left-sided headache just after the initial onset of the symptoms.  The patient states that this has also subsided.  While the symptoms were occurring he felt like his speech was slurred, and he developed weakness in both upper extremities.  The patient has no lingering symptoms at this time, and has never had any history of migraine headaches or similar symptoms in the past.  He denies experiencing any confusion, nausea, vomiting or other symptoms.  He is taking an herbal supplement in place of a baby aspirin.  He denies chest pain shortness of breath or recent illness.    Due to the severity of symptoms, especially vertical double vision I had concern for CVA versus malignant process in the brain. Fortunately the patient's CT imaging and MRI were negative for CVA or mass. CT imaging did show severe left maxillary sinusitis which could possibly be contributing to symptoms. He was ultimately referred to ENT for evaluation of sinusitis and ophthalmology for further evaluation of these transient vision changes. I advised the patient to return to the ED immediately should these symptoms occur again. I also advised that he follow up with his PCP in the next 2-3 days.    Problems Addressed:  Acute non-recurrent maxillary sinusitis: complicated acute illness or injury  Vision changes: complicated acute illness or injury    Amount and/or Complexity of Data Reviewed  Labs: ordered. Decision-making details documented in ED Course.  Radiology: ordered and independent interpretation performed.  Decision-making details documented in ED Course.  ECG/medicine tests: ordered and independent interpretation performed. Decision-making details documented in ED Course.    Risk  Prescription drug management.        Final diagnoses:   Acute non-recurrent maxillary sinusitis   Vision changes       ED Disposition  ED Disposition       ED Disposition   Discharge    Condition   Stable    Comment   --               Armond Sellers MD  [address] [place]  [facility] [id number] [phone]    Schedule an appointment as soon as possible for a visit in 2 days      [facility] EMERGENCY DEPART[facility] [place]  [address]  [facility] [address] [phone]  Go to   If symptoms worsen         Medication List        New Prescriptions      amoxicillin-clavulanate 875-125 MG per tablet  Commonly known as: AUGMENTIN  Take 1 tablet by mouth 2 (Two) Times a Day.               Where to Get Your Medications        These medications were sent to [facility] [id number] [place], [place] - [address] - [phone]  - [phone]   [address] [facility] [address]      Phone: [phone]   amoxicillin-clavulanate 875-125 MG per tablet

## 2025-06-18 NOTE — URGENT CARE PROVIDER NOTE
"Subjective   History of Present Illness  Wilmer Fitzgerald is a 59 y.o. male who presents for sudden onset of dizziness, double vision, maybe even triple vision, weakness that happened about 1 to 2 hours ago.  This whole episode lasted approximately an hour.  But went away by the time his wife came to get him and brought him here to urgent care.  He has no previous history of cardiac events or CVAs.  His mother recently has had a CVA.  Wilmer is a non-smoker nondrinker and does not take any prescription medicines on a daily basis.  He states that other than a little weakness he feels much better.    Review of Systems   Constitutional:  Positive for fatigue.   Neurological:  Positive for weakness and headaches.        Complaints of double and triple vision       Past Medical History:   Diagnosis Date    Cholinergic urticaria     COVID-19 2022    Foot trauma, left, sequela        No Known Allergies    Past Surgical History:   Procedure Laterality Date    DENTAL PROCEDURE      MOUTH SURGERY         History reviewed. No pertinent family history.    Social History     Socioeconomic History    Marital status:    Tobacco Use    Smoking status: Never    Smokeless tobacco: Never   Vaping Use    Vaping status: Never Used   Substance and Sexual Activity    Alcohol use: Yes     Alcohol/week: 1.0 standard drink of alcohol     Types: 1 Cans of beer per week     Comment: occ.    Drug use: Never    Sexual activity: Yes     Partners: Female           Objective     /98   Pulse 84   Temp 97.9 °F (36.6 °C) (Oral)   Resp 18   Ht 188 cm (74\")   Wt 108 kg (237 lb 9.6 oz)   SpO2 98%   BMI 30.51 kg/m²     Physical Exam  Vitals and nursing note reviewed.   Constitutional:       Appearance: Normal appearance.   HENT:      Head: Normocephalic and atraumatic.      Right Ear: Tympanic membrane and ear canal normal.      Left Ear: Tympanic membrane and ear canal normal.      Nose: Nose normal.      Mouth/Throat:      Pharynx: " Oropharynx is clear.   Eyes:      Extraocular Movements: Extraocular movements intact.      Conjunctiva/sclera: Conjunctivae normal.      Pupils: Pupils are equal, round, and reactive to light.   Cardiovascular:      Rate and Rhythm: Normal rate and regular rhythm.      Heart sounds: Normal heart sounds.   Pulmonary:      Effort: Pulmonary effort is normal.      Breath sounds: Normal breath sounds.   Abdominal:      General: Abdomen is flat. Bowel sounds are normal. There is no distension.      Palpations: Abdomen is soft.      Tenderness: There is no abdominal tenderness. There is no guarding or rebound.   Musculoskeletal:         General: Normal range of motion.      Cervical back: Normal range of motion and neck supple.   Skin:     General: Skin is warm and dry.   Neurological:      General: No focal deficit present.      Mental Status: He is alert and oriented to person, place, and time. Mental status is at baseline.      Sensory: No sensory deficit.      Motor: No weakness.      Coordination: Coordination normal.      Gait: Gait normal.      Deep Tendon Reflexes: Reflexes normal.   Psychiatric:         Mood and Affect: Mood normal.         Behavior: Behavior normal.         Thought Content: Thought content normal.         Judgment: Judgment normal.         Procedures           ED Course  ED Course as of 06/19/25 0937   Wed Jun 18, 2025   1354 EKG is normal sinus rhythm with a rate of 68 bpm RI interval is 170 ms QRS is 90 ms QTc 421 ms [EG]   2210 High Sensitivity Troponin T 1Hr [EG]   2210 BNP [EG]   2210 CBC & Differential [EG]   2210 TSH Baseline: 1.050 [EG]   2210 Magnesium: 2.3 [EG]   2210 C-Reactive Protein: <0.30 [EG]   2210 HS Troponin T: <6 [EG]   2210 Comprehensive Metabolic Panel(!) [EG]   2210 MRI Brain With & Without Contrast [EG]   2210 XR Chest 1 View [EG]   2210 CT Angiogram Head w AI Analysis of LVO [EG]   2210 CT Angiogram Neck [EG]   2210 CT Head Without Contrast Stroke Protocol [EG]      ED  Course User Index  [EG] Mindy Cano DO     Results for orders placed or performed during the hospital encounter of 06/18/25   Comprehensive Metabolic Panel    Collection Time: 06/18/25  1:13 PM    Specimen: Blood   Result Value Ref Range    Glucose 106 (H) 65 - 99 mg/dL    BUN 17.8 6.0 - 20.0 mg/dL    Creatinine 0.99 0.76 - 1.27 mg/dL    Sodium 142 136 - 145 mmol/L    Potassium 4.8 3.5 - 5.2 mmol/L    Chloride 107 98 - 107 mmol/L    CO2 25.4 22.0 - 29.0 mmol/L    Calcium 9.8 8.6 - 10.5 mg/dL    Total Protein 7.0 6.0 - 8.5 g/dL    Albumin 4.7 3.5 - 5.2 g/dL    ALT (SGPT) 24 1 - 41 U/L    AST (SGOT) 21 1 - 40 U/L    Alkaline Phosphatase 46 39 - 117 U/L    Total Bilirubin 0.8 0.0 - 1.2 mg/dL    Globulin 2.3 gm/dL    A/G Ratio 2.0 g/dL    BUN/Creatinine Ratio 18.0 7.0 - 25.0    Anion Gap 9.6 5.0 - 15.0 mmol/L    eGFR 88.3 >60.0 mL/min/1.73   High Sensitivity Troponin T    Collection Time: 06/18/25  1:13 PM    Specimen: Blood   Result Value Ref Range    HS Troponin T <6 <22 ng/L   BNP    Collection Time: 06/18/25  1:13 PM    Specimen: Blood   Result Value Ref Range    proBNP 36.1 0.0 - 900.0 pg/mL   C-reactive Protein    Collection Time: 06/18/25  1:13 PM    Specimen: Blood   Result Value Ref Range    C-Reactive Protein <0.30 0.00 - 0.50 mg/dL   TSH Rfx On Abnormal To Free T4    Collection Time: 06/18/25  1:13 PM    Specimen: Blood   Result Value Ref Range    TSH 1.050 0.270 - 4.200 uIU/mL   Magnesium    Collection Time: 06/18/25  1:13 PM    Specimen: Blood   Result Value Ref Range    Magnesium 2.3 1.6 - 2.6 mg/dL   CBC Auto Differential    Collection Time: 06/18/25  1:13 PM    Specimen: Blood   Result Value Ref Range    WBC 6.17 3.40 - 10.80 10*3/mm3    RBC 5.28 4.14 - 5.80 10*6/mm3    Hemoglobin 15.6 13.0 - 17.7 g/dL    Hematocrit 44.6 37.5 - 51.0 %    MCV 84.5 79.0 - 97.0 fL    MCH 29.5 26.6 - 33.0 pg    MCHC 35.0 31.5 - 35.7 g/dL    RDW 12.6 12.3 - 15.4 %    RDW-SD 38.7 37.0 - 54.0 fl    MPV 8.2 6.0 - 12.0 fL     Platelets 251 140 - 450 10*3/mm3    Neutrophil % 63.3 42.7 - 76.0 %    Lymphocyte % 25.8 19.6 - 45.3 %    Monocyte % 8.9 5.0 - 12.0 %    Eosinophil % 1.3 0.3 - 6.2 %    Basophil % 0.5 0.0 - 1.5 %    Immature Grans % 0.2 0.0 - 0.5 %    Neutrophils, Absolute 3.91 1.70 - 7.00 10*3/mm3    Lymphocytes, Absolute 1.59 0.70 - 3.10 10*3/mm3    Monocytes, Absolute 0.55 0.10 - 0.90 10*3/mm3    Eosinophils, Absolute 0.08 0.00 - 0.40 10*3/mm3    Basophils, Absolute 0.03 0.00 - 0.20 10*3/mm3    Immature Grans, Absolute 0.01 0.00 - 0.05 10*3/mm3   Gold Top - SST    Collection Time: 06/18/25  1:13 PM   Result Value Ref Range    Extra Tube Hold for add-ons.    Light Blue Top    Collection Time: 06/18/25  1:13 PM   Result Value Ref Range    Extra Tube Hold for add-ons.    ECG 12 Lead Other; blurred vision    Collection Time: 06/18/25  1:20 PM   Result Value Ref Range    QT Interval 396 ms    QTC Interval 421 ms   High Sensitivity Troponin T 1Hr    Collection Time: 06/18/25  2:39 PM    Specimen: Blood   Result Value Ref Range    HS Troponin T <6 <22 ng/L    Troponin T Numeric Delta          Diagnoses and all orders for this visit:    1. Acute non-recurrent maxillary sinusitis (Primary)  -     Ambulatory Referral to ENT (Otolaryngology)    2. Vision changes  -     Ambulatory Referral to Ophthalmology    Other orders  -     CBC & Differential; Standing  -     Comprehensive Metabolic Panel; Standing  -     High Sensitivity Troponin T; Standing  -     BNP; Standing  -     C-reactive Protein; Standing  -     TSH Rfx On Abnormal To Free T4; Standing  -     Magnesium; Standing  -     ECG 12 Lead Other; blurred vision; Standing  -     XR Chest 1 View; Standing  -     Cancel: CT Head Without Contrast; Standing  -     CT Angiogram Head w AI Analysis of LVO; Standing  -     CT Angiogram Neck; Standing  -     Cancel: NPO Diet NPO Type: Strict NPO; Standing  -     CBC & Differential  -     Comprehensive Metabolic Panel  -     High Sensitivity  Troponin T  -     BNP  -     C-reactive Protein  -     TSH Rfx On Abnormal To Free T4  -     Magnesium  -     ECG 12 Lead Other; blurred vision  -     XR Chest 1 View  -     Cancel: CT Head Without Contrast  -     CT Angiogram Head w AI Analysis of LVO  -     CT Angiogram Neck  -     Cancel: NPO Diet NPO Type: Strict NPO  -     [place] Draw; Standing  -     [place] Draw  -     CT Head Without Contrast Stroke Protocol; Standing  -     CT Head Without Contrast Stroke Protocol  -     High Sensitivity Troponin T 1Hr; Standing  -     High Sensitivity Troponin T 1Hr  -     iopamidol (ISOVUE-370) 76 % injection 100 mL  -     MRI Brain With & Without Contrast; Standing  -     MRI Brain With & Without Contrast  -     gadobenate dimeglumine (MULTIHANCE) injection 20 mL  -     amoxicillin-clavulanate (AUGMENTIN) 875-125 MG per tablet; Take 1 tablet by mouth 2 (Two) Times a Day.  Dispense: 20 tablet; Refill: 0                 Medical Decision Making  I recommended he go to the emergency room to rule out CVA.  We discussed that he needs extensive blood work and his CT of the head.  He is to remain n.p.o. until he is evaluated by an ERP.  Follow-up with us as necessary.    Problems Addressed:  Acute non-recurrent maxillary sinusitis: complicated acute illness or injury  Vision changes: complicated acute illness or injury    Amount and/or Complexity of Data Reviewed  Labs: ordered. Decision-making details documented in ED Course.  Radiology: ordered. Decision-making details documented in ED Course.  ECG/medicine tests: ordered.    Risk  Prescription drug management.        Final diagnoses:   Acute non-recurrent maxillary sinusitis   Vision changes                Edilia Burgos, JOHN  06/18/25 1529       Edilia Burgos, JOHN  06/18/25 1556       Edilia Burgos, JOHN  06/19/25 0937

## 2025-07-07 ENCOUNTER — OFFICE VISIT (OUTPATIENT)
Dept: FAMILY MEDICINE CLINIC | Facility: CLINIC | Age: 60
End: 2025-07-07
Payer: COMMERCIAL

## 2025-07-07 VITALS
BODY MASS INDEX: 30.34 KG/M2 | HEIGHT: 74 IN | DIASTOLIC BLOOD PRESSURE: 72 MMHG | WEIGHT: 236.4 LBS | SYSTOLIC BLOOD PRESSURE: 118 MMHG | HEART RATE: 76 BPM | OXYGEN SATURATION: 97 %

## 2025-07-07 DIAGNOSIS — Z12.5 PROSTATE CANCER SCREENING: ICD-10-CM

## 2025-07-07 DIAGNOSIS — Z12.11 SCREENING FOR COLON CANCER: ICD-10-CM

## 2025-07-07 DIAGNOSIS — E66.811 CLASS 1 OBESITY DUE TO EXCESS CALORIES WITHOUT SERIOUS COMORBIDITY WITH BODY MASS INDEX (BMI) OF 30.0 TO 30.9 IN ADULT: ICD-10-CM

## 2025-07-07 DIAGNOSIS — Z00.00 GENERAL MEDICAL EXAM: Primary | ICD-10-CM

## 2025-07-07 DIAGNOSIS — E78.2 HYPERLIPIDEMIA, MIXED: ICD-10-CM

## 2025-07-07 DIAGNOSIS — Z85.828 HISTORY OF SQUAMOUS CELL CARCINOMA OF SKIN: ICD-10-CM

## 2025-07-07 DIAGNOSIS — E66.09 CLASS 1 OBESITY DUE TO EXCESS CALORIES WITHOUT SERIOUS COMORBIDITY WITH BODY MASS INDEX (BMI) OF 30.0 TO 30.9 IN ADULT: ICD-10-CM

## 2025-07-07 PROCEDURE — 99396 PREV VISIT EST AGE 40-64: CPT | Performed by: FAMILY MEDICINE

## 2025-07-07 NOTE — ASSESSMENT & PLAN NOTE
Discussed together health maintenance and screening along with vaccination options and healthy diet and exercise habits as part of the preventative counseling at their physical exam today.     Encouraged Shingrix and PCV21  He will consider at his pharmacy

## 2025-07-07 NOTE — PROGRESS NOTES
"Chief Complaint  Physical     Subjective    History of Present Illness:  Wilmer Fitzgerald is a 59 y.o. male who presents today for annual exam.    No regular medications and exercises on a regular basis.    Declines colonoscopy.  Cologuard negative 7/8/24 with plans for repeat in July 2027    Screening PSA last done at his physical in July 2024.  He had labs through work and would like them reviewed before getting a PSA today     He did have labs done last month with an ER visit for visual changes rule out CVA - including CBC, CMP, Troponin, BNP, TSH, Mg.  CBC normal, CMP with mild non-fasting glucose elevation, Troponin neg, BNP normal, CRP normal, TSH normal, and Mg normal.     [facility] labs through work - will get them for us to review.     Workup for CVA with normal CTA and brtain MRI normal aside from L maxillary sinus opacification with ENT consult recommended. He was treated with augmentin but held off on the antibiotic.    Plans for ENT and ophthalmology/optometry visit given visual changes triggered his ER visit.     Hx of R forearm SCC, would like to re-est care with dermatology    Last blood work at his physical in 2022 did show significant hyperlipidemia consistent with familial hyperlipidemia.  We did discuss his LDL elevation over 200 and he did have consultation with Cardiology and a coronary calcium score done with min plaque burden and low-risk.  Followup planned with cardiology     Objective   Vital Signs:   /72 (BP Location: Right arm, Patient Position: Sitting, Cuff Size: Large Adult)   Pulse 76   Ht 188 cm (74\")   Wt 107 kg (236 lb 6.4 oz)   SpO2 97%   BMI 30.35 kg/m²     Review of Systems   Constitutional:  Negative for appetite change, chills and fever.   HENT:  Positive for hearing loss.    Eyes:  Negative for blurred vision.   Respiratory:  Negative for chest tightness.    Cardiovascular:  Negative for chest pain.   Gastrointestinal:  Negative for abdominal pain.   Musculoskeletal:  " Negative for gait problem.   Skin:  Negative for rash.   Psychiatric/Behavioral:  Negative for depressed mood.        Past History:  Medical History: has a past medical history of Cholinergic urticaria, COVID-19 (2022), and Foot trauma, left, sequela.   Surgical History: has a past surgical history that includes Mouth surgery and Dental surgery.   Family History: family history is not on file.   Social History: reports that he has never smoked. He has never used smokeless tobacco. He reports current alcohol use of about 1.0 standard drink of alcohol per week. He reports that he does not use drugs.      Current Outpatient Medications:     MAGNESIUM PO, Take 1 tablet by mouth As Needed., Disp: , Rfl:     Nattokinase 100 MG capsule, Take 200 mg by mouth Daily., Disp: , Rfl:     Allergies: Patient has no known allergies.    Physical Exam  Constitutional:       Appearance: Normal appearance.   HENT:      Head: Normocephalic.      Right Ear: External ear normal.      Left Ear: External ear normal.      Nose: Nose normal.      Mouth/Throat:      Mouth: Mucous membranes are moist.      Pharynx: Oropharynx is clear.   Eyes:      Extraocular Movements: Extraocular movements intact.      Conjunctiva/sclera: Conjunctivae normal.      Pupils: Pupils are equal, round, and reactive to light.   Cardiovascular:      Rate and Rhythm: Normal rate and regular rhythm.      Heart sounds: Normal heart sounds. No murmur heard.     No friction rub. No gallop.   Pulmonary:      Effort: Pulmonary effort is normal.      Breath sounds: Normal breath sounds.   Abdominal:      Tenderness: There is no abdominal tenderness. There is no guarding or rebound.      Hernia: No hernia is present.   Musculoskeletal:         General: Normal range of motion.      Cervical back: Normal range of motion.   Skin:     General: Skin is warm and dry.   Neurological:      General: No focal deficit present.      Mental Status: He is alert and oriented to person,  place, and time.   Psychiatric:         Mood and Affect: Mood normal.         Behavior: Behavior normal.         Thought Content: Thought content normal.         Judgment: Judgment normal.          Result Review                   Assessment and Plan  Diagnoses and all orders for this visit:    1. General medical exam (Primary)  Assessment & Plan:  Discussed together health maintenance and screening along with vaccination options and healthy diet and exercise habits as part of the preventative counseling at their physical exam today.     Encouraged Shingrix and PCV21  He will consider at his pharmacy      2. Prostate cancer screening  Assessment & Plan:  See above      3. Screening for colon cancer  Assessment & Plan:  Declines colonoscopy     Cologuard neg 7/2024, plans for repeat 7/2027    Awaiting labs from work and he will return to get A1c, lipids, psa if not done       4. Hyperlipidemia, mixed  Assessment & Plan:  Reviewed CT calcium testing and CTA    Has plans to followup with Cardiology    Awaiting fasting labs done through [facility] with work to review      5. History of squamous cell carcinoma of skin  -     Ambulatory Referral to Dermatology    6. Class 1 obesity due to excess calories without serious comorbidity with body mass index (BMI) of 30.0 to 30.9 in adult  Assessment & Plan:  Patient's (Body mass index is 30.35 kg/m².) indicates that they are obese (BMI >30) with health conditions that include none . Weight is unchanged. BMI  is above average; BMI management plan is completed. We discussed portion control and increasing exercise.                     Follow Up  Return in about 1 year (around 7/8/2026) for Annual physical.    Armond Sellers MD

## 2025-07-07 NOTE — ASSESSMENT & PLAN NOTE
Reviewed CT calcium testing and CTA    Has plans to followup with Cardiology    Awaiting fasting labs done through [facility] with work to review

## 2025-07-07 NOTE — ASSESSMENT & PLAN NOTE
Declines colonoscopy     Cologuard neg 7/2024, plans for repeat 7/2027    Awaiting labs from work and he will return to get A1c, lipids, psa if not done

## 2025-07-07 NOTE — ASSESSMENT & PLAN NOTE
Patient's (Body mass index is 30.35 kg/m².) indicates that they are obese (BMI >30) with health conditions that include none . Weight is unchanged. BMI  is above average; BMI management plan is completed. We discussed portion control and increasing exercise.